# Patient Record
Sex: FEMALE | Race: WHITE | NOT HISPANIC OR LATINO | URBAN - METROPOLITAN AREA
[De-identification: names, ages, dates, MRNs, and addresses within clinical notes are randomized per-mention and may not be internally consistent; named-entity substitution may affect disease eponyms.]

---

## 2021-08-13 ENCOUNTER — NEW SKIN PROBLEM (OUTPATIENT)
Dept: URBAN - METROPOLITAN AREA CLINIC 38 | Facility: CLINIC | Age: 46
Setting detail: DERMATOLOGY
End: 2021-08-13

## 2021-08-13 ENCOUNTER — RX ONLY (RX ONLY)
Age: 46
End: 2021-08-13

## 2021-08-13 PROBLEM — B35.1 TINEA UNGUIUM: Status: RESOLVED | Noted: 2021-08-13

## 2021-08-13 PROCEDURE — 99203 OFFICE O/P NEW LOW 30 MIN: CPT

## 2021-08-13 RX ORDER — KETOCONAZOLE 20 MG/G
LIBERALLY CREAM TOPICAL TWICE A DAY
Qty: 60 | Refills: 1
Start: 2021-08-13

## 2021-10-13 ENCOUNTER — FOLLOW-UP (OUTPATIENT)
Dept: URBAN - METROPOLITAN AREA CLINIC 38 | Facility: CLINIC | Age: 46
Setting detail: DERMATOLOGY
End: 2021-10-13

## 2021-10-13 DIAGNOSIS — L28.1 PRURIGO NODULARIS: ICD-10-CM

## 2021-10-13 PROBLEM — B35.1 TINEA UNGUIUM: Status: RESOLVED | Noted: 2021-10-13

## 2021-10-13 PROCEDURE — 99213 OFFICE O/P EST LOW 20 MIN: CPT

## 2021-10-13 RX ORDER — TERBINAFINE HYDROCHLORIDE 250 MG/1
1 TABLET TABLET ORAL ONCE A DAY
Qty: 30 | Refills: 1
Start: 2021-10-13

## 2022-01-06 ENCOUNTER — FOLLOW-UP (OUTPATIENT)
Dept: URBAN - METROPOLITAN AREA CLINIC 38 | Facility: CLINIC | Age: 47
Setting detail: DERMATOLOGY
End: 2022-01-06

## 2022-01-06 ENCOUNTER — RX ONLY (RX ONLY)
Age: 47
End: 2022-01-06

## 2022-01-06 DIAGNOSIS — L01.01 NON-BULLOUS IMPETIGO: ICD-10-CM

## 2022-01-06 DIAGNOSIS — L30.3 INFECTIVE DERMATITIS: ICD-10-CM

## 2022-01-06 PROBLEM — B35.1 TINEA UNGUIUM: Status: RESOLVED | Noted: 2022-01-06

## 2022-01-06 PROCEDURE — 99213 OFFICE O/P EST LOW 20 MIN: CPT

## 2022-01-06 RX ORDER — TERBINAFINE HYDROCHLORIDE 250 MG/1
1 TABLET TABLET ORAL ONCE A DAY
Qty: 30 | Refills: 1
Start: 2022-01-06

## 2024-11-23 ENCOUNTER — OFFICE VISIT (OUTPATIENT)
Dept: URGENT CARE | Facility: CLINIC | Age: 49
End: 2024-11-23
Payer: MEDICARE

## 2024-11-23 VITALS
HEART RATE: 84 BPM | DIASTOLIC BLOOD PRESSURE: 83 MMHG | RESPIRATION RATE: 16 BRPM | WEIGHT: 178 LBS | HEIGHT: 66 IN | OXYGEN SATURATION: 99 % | SYSTOLIC BLOOD PRESSURE: 122 MMHG | TEMPERATURE: 96.8 F | BODY MASS INDEX: 28.61 KG/M2

## 2024-11-23 DIAGNOSIS — K04.7 DENTAL INFECTION: Primary | ICD-10-CM

## 2024-11-23 PROCEDURE — 99213 OFFICE O/P EST LOW 20 MIN: CPT | Performed by: NURSE PRACTITIONER

## 2024-11-23 RX ORDER — NORTRIPTYLINE HYDROCHLORIDE 50 MG/1
50 CAPSULE ORAL
COMMUNITY
Start: 2024-10-05

## 2024-11-23 RX ORDER — BUSPIRONE HYDROCHLORIDE 15 MG/1
15 TABLET ORAL
COMMUNITY
Start: 2024-10-04

## 2024-11-23 RX ORDER — ROSUVASTATIN CALCIUM 40 MG/1
40 TABLET, COATED ORAL
COMMUNITY
Start: 2024-10-03

## 2024-11-23 RX ORDER — METFORMIN HYDROCHLORIDE 500 MG/1
500 TABLET, EXTENDED RELEASE ORAL
COMMUNITY
Start: 2024-10-04

## 2024-11-23 RX ORDER — ATORVASTATIN CALCIUM 80 MG/1
80 TABLET, FILM COATED ORAL NIGHTLY
COMMUNITY
Start: 2024-02-21

## 2024-11-23 RX ORDER — EZETIMIBE 10 MG/1
10 TABLET ORAL
COMMUNITY
Start: 2024-10-04

## 2024-11-23 RX ORDER — BUPROPION HYDROCHLORIDE 300 MG/1
300 TABLET ORAL
COMMUNITY
Start: 2024-10-04

## 2024-11-23 RX ORDER — AZITHROMYCIN 250 MG/1
TABLET, FILM COATED ORAL
Qty: 6 TABLET | Refills: 0 | Status: SHIPPED | OUTPATIENT
Start: 2024-11-23

## 2024-11-23 RX ORDER — TOPIRAMATE 25 MG/1
25 TABLET ORAL
COMMUNITY
Start: 2024-10-04

## 2024-11-23 NOTE — PROGRESS NOTES
[facility] URGENT CARE  Gin CONSUELO Menjivar, APRN-CNP     Visit Note - 2024 2:40 PM   This note was generated with voice recognition software and may contain errors including spelling, grammar, syntax, and misrecognization of what was dictated.    Patient: Yung Dukes, MRN: [id number], 49 y.o., female   PCP: Henna Nuñez MD  ------------------------------------  ALLERGIES:   Allergies   Allergen Reactions    Aspirin Anaphylaxis    Penicillins Anaphylaxis    Clindamycin Unknown        CURRENT MEDICATIONS:   Current Outpatient Medications   Medication Instructions    atorvastatin (LIPITOR) 80 mg, Nightly    azithromycin (Zithromax Z-Devyn) 250 mg tablet Take 2 tablets by mouth at once on day 1, then 1 tablet once a day on days 2-5. Take with a meal.    buPROPion XL (WELLBUTRIN XL) 300 mg    busPIRone (BUSPAR) 15 mg    ezetimibe (ZETIA) 10 mg    metFORMIN XR (GLUCOPHAGE-XR) 500 mg    nortriptyline (PAMELOR) 50 mg    rosuvastatin (CRESTOR) 40 mg    topiramate (TOPAMAX) 25 mg     ------------------------------------  PAST MEDICAL HX:  History of diabetes, mood disorder, migraines, hyperlipidemia.      SURGICAL HX:  Past Surgical History:   Procedure Laterality Date     SECTION, CLASSIC      CHOLECYSTECTOMY        FAMILY HX:   No pertinent history.   SOCIAL HX:    reports that she has been smoking cigarettes. She has never used smokeless tobacco. Employed.   ------------------------------------  CHIEF COMPLAINT:   Chief Complaint   Patient presents with    Dental Pain     Tooth abscess x 2 days. Pt states her tooth broke.       HISTORY OF PRESENT ILLNESS: The history was obtained from patient. Yung is a 49 y.o. female, who presents with a chief complaint of dental pain x 3 days. Reports she had a tooth that broke off earlier this month, but it did not become bothersome until a few days ago. Reports jaw and the area around her tooth seem a little swollen; she believes she might have had  "some subjective fevers, but is unsure. Denies any chills, body aches, changes in mental status, malaise, fatigue, n/v, sinus congestion/pain, sore throat, headaches, ear pain, or other symptoms. Reports has pain with eating and drinking. Reports has a history of dental infection. Denny was planning to get the tooth pulled, but was waiting until her next paycheck. Has been taking Tylenol, ibuprofen, and rinsing with salt water and peroxide, without much relief. Is a current smoker.      REVIEW OF SYSTEMS:  10 systems reviewed negative with exception of history of present illness as listed above.    TODAY'S VITALS: /83   Pulse 84   Temp 36 °C (96.8 °F) (Temporal)   Resp 16   Ht 1.67 m (5' 5.75\")   Wt 80.7 kg (178 lb)   SpO2 99%   BMI 28.95 kg/m²     PHYSICAL EXAMINATION:  General: Pleasant female, alert and oriented, in no acute distress. Sitting comfortably on exam table.   Eyes:  Pupils are equal, round and reactive. No conjunctival erythema or exudate to eyes noted.   HENT: Normocephalic; TMs and ear canals bilat clear. Nasal mucosa unremarkable; no audible nasal congestion or sinus tenderness; no maxillary edema. Mucous membranes moist. No oral lesions; posterior pharynx unremarkable. Able to swallow/manage oral secretions without difficulty. Very poor dentition, with several absent and several broken teeth with severe dental caries. Gumline above a severely broken/carried tooth #20 (L side, lower jaw) slightly swollen and erythematous, and exquisitely tender to palpation. No fluctuance or visible pointing to the area. No active drainage.  Neck:  Supple; Tender, mobile high anterior cervical lymphadenopathy on the L.   Respiratory:  Respirations are easy and non-labored, with normal rate. Symmetrical chest wall expansion. Lungs are clear to auscultation - no wheezing, rhonchi, or rales.   Cardiovascular:  Normal rate, Regular rhythm. Normal S1S2. No m/r/g.   Integumentary:  Pink, warm, dry, and " Intact. Normal skin turgor; no rashes appreciated. Good pulses/perfusion.  Neurologic:  Alert, Oriented, Sensory and motor function grossly intact for age.   Cognition and Speech: Oriented; Speech clear and coherent  Psychiatric:  Cooperative, Appropriate mood & affect.     ------------------------------------  Medical Decision Making  LABORATORY or RADIOLOGICAL IMAGING ORDERS/RESULTS:   None    IMPRESSION/PLAN:  Course: Worsening; stable    1. Dental infection (Primary)  - azithromycin (Zithromax Z-Devyn) 250 mg tablet; Take 2 tablets by mouth at once on day 1, then 1 tablet once a day on days 2-5. Take with a meal.  Dispense: 6 tablet; Refill: 0    No red flags on exam, but will need close monitoring; No evidence of drainable abscess or of Jonny Angina at this time. Will begin antibiotic treatment for dental infection today (Zithromax, per pt's specific request - reports has allergies to both PN and clindamycin, but has been rx'd Zithromax in the past, with relief of her dental infections - patient aware needs to monitor closely, as this is not first line therapy), but stressed importance of following up with dental clinic, as without intervention, infection can continue to reoccur. Urged to complete full course of treatment, even if symptoms resolve more quickly. Encouraged to  rest, to push fluids, and NSAIDs for discomfort; can use Tylenol as adjunct. Warm salt water gargles and cool compresses may also be helpful. Reviewed red flags to monitor for, expectations for resolution of infection, instructions and common side effects of treatment, and encouraged to RTC or see PCP/dentist if symptoms not improving over the next 24-48 hours, or to seek care sooner if sxs worsen/any red flags develop. Should also contact dentist ASAP to let them know that antibiotic treatment was started, and to make plan for f/u. Patient seems satisfied and agrees with plan of care; questions were encouraged and answered.         Gin CORDERO  STEVE Menjivar-BRUCE   Advanced Practice Provider  [facility] URGENT [facility]